# Patient Record
Sex: FEMALE | Race: BLACK OR AFRICAN AMERICAN | NOT HISPANIC OR LATINO | Employment: FULL TIME | URBAN - METROPOLITAN AREA
[De-identification: names, ages, dates, MRNs, and addresses within clinical notes are randomized per-mention and may not be internally consistent; named-entity substitution may affect disease eponyms.]

---

## 2019-01-17 LAB — PAP SMEAR: NORMAL

## 2019-10-08 ENCOUNTER — HOSPITAL ENCOUNTER (OUTPATIENT)
Dept: RADIOLOGY | Facility: HOSPITAL | Age: 39
Discharge: HOME | End: 2019-10-08
Attending: FAMILY MEDICINE
Payer: COMMERCIAL

## 2019-10-08 ENCOUNTER — OFFICE VISIT (OUTPATIENT)
Dept: FAMILY MEDICINE | Facility: CLINIC | Age: 39
End: 2019-10-08
Payer: COMMERCIAL

## 2019-10-08 VITALS
SYSTOLIC BLOOD PRESSURE: 116 MMHG | DIASTOLIC BLOOD PRESSURE: 84 MMHG | WEIGHT: 194.69 LBS | OXYGEN SATURATION: 98 % | HEIGHT: 67 IN | TEMPERATURE: 98 F | BODY MASS INDEX: 30.56 KG/M2 | HEART RATE: 82 BPM

## 2019-10-08 DIAGNOSIS — G89.29 CHRONIC HEEL PAIN, RIGHT: ICD-10-CM

## 2019-10-08 DIAGNOSIS — R23.2 HOT FLASHES: Primary | ICD-10-CM

## 2019-10-08 DIAGNOSIS — M79.671 CHRONIC HEEL PAIN, RIGHT: ICD-10-CM

## 2019-10-08 DIAGNOSIS — Z13.6 SCREENING FOR CARDIOVASCULAR CONDITION: ICD-10-CM

## 2019-10-08 DIAGNOSIS — R53.83 OTHER FATIGUE: ICD-10-CM

## 2019-10-08 DIAGNOSIS — E66.09 CLASS 1 OBESITY DUE TO EXCESS CALORIES WITHOUT SERIOUS COMORBIDITY WITH BODY MASS INDEX (BMI) OF 30.0 TO 30.9 IN ADULT: ICD-10-CM

## 2019-10-08 DIAGNOSIS — B37.31 YEAST INFECTION OF THE VAGINA: ICD-10-CM

## 2019-10-08 PROCEDURE — 73630 XR FOOT COMPLETE 3 VIEW RIGHT: ICD-10-PCS | Mod: 26,RT,, | Performed by: RADIOLOGY

## 2019-10-08 PROCEDURE — 99203 OFFICE O/P NEW LOW 30 MIN: CPT | Mod: S$GLB,,, | Performed by: FAMILY MEDICINE

## 2019-10-08 PROCEDURE — 73630 X-RAY EXAM OF FOOT: CPT | Mod: TC,FY,PO,RT

## 2019-10-08 PROCEDURE — 99203 PR OFFICE/OUTPT VISIT, NEW, LEVL III, 30-44 MIN: ICD-10-PCS | Mod: S$GLB,,, | Performed by: FAMILY MEDICINE

## 2019-10-08 PROCEDURE — 99999 PR PBB SHADOW E&M-NEW PATIENT-LVL IV: CPT | Mod: PBBFAC,,, | Performed by: FAMILY MEDICINE

## 2019-10-08 PROCEDURE — 73630 X-RAY EXAM OF FOOT: CPT | Mod: 26,RT,, | Performed by: RADIOLOGY

## 2019-10-08 PROCEDURE — 99204 OFFICE O/P NEW MOD 45 MIN: CPT | Mod: PBBFAC,25,PO | Performed by: FAMILY MEDICINE

## 2019-10-08 PROCEDURE — 99999 PR PBB SHADOW E&M-NEW PATIENT-LVL IV: ICD-10-PCS | Mod: PBBFAC,,, | Performed by: FAMILY MEDICINE

## 2019-10-08 NOTE — PATIENT INSTRUCTIONS
Eating Heart-Healthy Food: Using the DASH Plan    Eating for your heart doesnt have to be hard or boring. You just need to know how to make healthier choices. The DASH eating plan has been developed to help you do just that. DASH stands for Dietary Approaches to Stop Hypertension. It is a plan that has been proven to be healthier for your heart and to lower your risk for high blood pressure. It can also help lower your risk for cancer, heart disease, osteoporosis, and diabetes.  Choosing from each food group  Choose foods from each of the food groups below each day. Try to get the recommended number of servings for each food group. The serving numbers are based on a diet of 2,000 calories a day. Talk to your doctor if youre unsure about your calorie needs. Along with getting the correct servings, the DASH plan also recommends a sodium intake less than 2,300 mg per day.        Grains  Servings: 6 to 8 a day  A serving is:  · 1 slice bread  · 1 ounce dry cereal  · Half a cup cooked rice, pasta or cereal  Best choices: Whole grains and any grains high in fiber. Vegetables  Servings: 4 to 5 a day  A serving is:  · 1 cup raw leafy vegetable  · Half a cup cut-up raw or cooked vegetable  · Half a cup vegetable juice  Best choices: Fresh or frozen vegetables prepared without added salt or fat.   Fruits  Servings: 4 to 5 a day  A serving is:  · 1 medium fruit  · One-quarter cup dried fruit  · Half a cup fresh, frozen, or canned fruit  · Half a cup of 100% fruit juices  Best choices: A variety of fresh fruits of different colors. Whole fruits are a better choice than fruit juices. Low-fat or fat-free dairy  Servings: 2 to 3 a day  A serving is:  · 1 cup milk  · 1 cup yogurt  · One and a half ounces cheese  Best choices: Skim or 1% milk, low-fat or fat-free yogurt or buttermilk, and low-fat cheeses.         Lean meats, poultry, fish  Servings: 6 or fewer a day  A serving is:  · 1 ounce cooked meats, poultry, or fish  · 1  egg  Best choices: Lean poultry and fish. Trim away visible fat. Broil, grill, roast, or boil instead of frying. Remove skin from poultry before eating. Limit how much red meat you eat.  Nuts, seeds, beans  Servings: 4 to 5 a week  A serving is:  · One-third cup nuts (one and a half ounces)  · 2 tablespoons nut butter or seeds  · Half a cup cooked dry beans or legumes  Best choices: Dry roasted nuts with no salt added, lentils, kidney beans, garbanzo beans, and whole white beans.   Fats and oils  Servings: 2 to 3 a day  A serving is:  · 1 teaspoon vegetable oil  · 1 teaspoon soft margarine  · 1 tablespoon mayonnaise  · 2 tablespoons salad dressing  Best choices: Nut and vegetable oils (nontropical vegetable oils), such as olive and canola oil. Sweets  Servings: 5 a week or fewer  A serving is:  · 1 tablespoon sugar, maple syrup, or honey  · 1 tablespoon jam or jelly  · 1 half-ounce jelly beans (about 15)  · 1 cup lemonade  Best choices: Dried fruit can be a satisfying sweet. Choose low-fat sweets. And watch your serving sizes!      For more on the DASH eating plan, visit:  www.nhlbi.nih.gov/health/health-topics/topics/dash   Date Last Reviewed: 6/1/2016  © 1536-6153 [employer]. [address] [place] [address], [place] [address]. All rights reserved. This information is not intended as a substitute for professional medical care. Always follow your healthcare professional's instructions.

## 2019-10-08 NOTE — PROGRESS NOTES
Subjective:       Patient ID: Dina Stewart is a 39 y.o. female.    Chief Complaint: Establish Care (having hot flashes, soles of feet hurt-sharp pain)    HPI     The patient is coming here today to establish a new primary care physician.  The patient complains of hot flashes, fatigued, recurrent use infections.  The patient went to see her gynecologist who was treating her with Diflucan multiple times, the patient was getting better and the symptoms recur, she was told to follow with the primary care doctor for further assessment on this.  The patient also complains of pain on the right foot, the patient stated the symptoms are getting worse, started more than 2 months ago.  The patient is not been trying medications for this, the patient stated that hurts worse when she walks.  She denies symptoms of numbness, tingling sensation, redness, swelling.    Past medical history, past social history, surgical history, family history was reviewed and discussed with the patient.    Review of Systems   Constitutional: Positive for fatigue. Negative for activity change and appetite change.   HENT: Negative for congestion and ear discharge.    Eyes: Negative for discharge and itching.   Respiratory: Negative for choking and chest tightness.    Cardiovascular: Negative for chest pain and leg swelling.   Gastrointestinal: Negative for abdominal distention and abdominal pain.   Endocrine: Negative for cold intolerance and heat intolerance.   Genitourinary: Negative for dysuria and flank pain.   Musculoskeletal: Positive for arthralgias. Negative for back pain.   Skin: Negative for pallor and rash.   Allergic/Immunologic: Negative for environmental allergies and food allergies.   Neurological: Negative for dizziness and facial asymmetry.   Hematological: Negative for adenopathy. Does not bruise/bleed easily.   Psychiatric/Behavioral: Negative for agitation and confusion.       Objective:      Physical Exam   Constitutional: She  appears well-developed and well-nourished. No distress.   HENT:   Head: Normocephalic and atraumatic.   Right Ear: External ear normal.   Left Ear: External ear normal.   Mouth/Throat: Oropharynx is clear and moist. No oropharyngeal exudate.   Eyes: Pupils are equal, round, and reactive to light. Conjunctivae are normal. Right eye exhibits no discharge. Left eye exhibits no discharge. No scleral icterus.   Neck: Neck supple. No thyromegaly present.   Cardiovascular: Normal rate, regular rhythm and normal heart sounds.   No murmur heard.  Pulmonary/Chest: Effort normal and breath sounds normal. No respiratory distress. She has no wheezes.   Abdominal: She exhibits no distension. There is no tenderness.   Musculoskeletal: She exhibits tenderness (Right foot plantar aspect pain). She exhibits no deformity.   Neurological: No cranial nerve deficit. Coordination normal.   Skin: No rash noted. She is not diaphoretic. No erythema. No pallor.   Psychiatric: She has a normal mood and affect. Her behavior is normal. Judgment and thought content normal.   Nursing note and vitals reviewed.      Assessment:       1. Hot flashes    2. Other fatigue    3. Yeast infection of the vagina    4. Class 1 obesity due to excess calories without serious comorbidity with body mass index (BMI) of 30.0 to 30.9 in adult    5. Screening for cardiovascular condition    6. Chronic heel pain, right        Plan:       Hot flashes:  New problem, workup needed  -     PROGESTERONE; Future; Expected date: 10/08/2019  -     ESTROGENS, TOTAL; Future; Expected date: 10/08/2019  -     Luteinizing hormone; Future; Expected date: 10/08/2019  -     Follicle stimulating hormone; Future; Expected date: 10/08/2019    Other fatigue:  New problem workup needed  -     CBC auto differential; Future; Expected date: 10/08/2019  -     Comprehensive metabolic panel; Future; Expected date: 10/08/2019  -     TSH; Future; Expected date: 10/08/2019    Yeast infection of the  vagina:  Improved    Class 1 obesity due to excess calories without serious comorbidity with body mass index (BMI) of 30.0 to 30.9 in adult:  Worsening  -     TSH; Future; Expected date: 10/08/2019  -     INSULIN, RANDOM; Future; Expected date: 10/08/2019    Screening for cardiovascular condition  -     Lipid panel; Future; Expected date: 10/08/2019    Chronic heel pain, right: New problem workup needed  -     X-Ray Foot Complete Right; Future; Expected date: 10/08/2019  -     Ambulatory referral to Podiatry    Will refer the patient for x-rays of the foot, will refer the patient also the podiatrist.  Will call the patient after we have the results of the test.  The patient was advised to eat healthy, avoid fried foods, red meat and processed starches.  The patient's BMI has been recorded in the chart. The patient has been provided educational materials regarding the benefits of attaining and maintaining a normal weight. We will continue to address and follow this issue during follow up visits.Patient agreed with assessment and plan. Patient verbalized understanding.

## 2019-10-09 ENCOUNTER — LAB VISIT (OUTPATIENT)
Dept: LAB | Facility: HOSPITAL | Age: 39
End: 2019-10-09
Attending: FAMILY MEDICINE
Payer: COMMERCIAL

## 2019-10-09 DIAGNOSIS — Z13.6 SCREENING FOR CARDIOVASCULAR CONDITION: ICD-10-CM

## 2019-10-09 DIAGNOSIS — R53.83 OTHER FATIGUE: ICD-10-CM

## 2019-10-09 DIAGNOSIS — R23.2 HOT FLASHES: ICD-10-CM

## 2019-10-09 DIAGNOSIS — E66.09 CLASS 1 OBESITY DUE TO EXCESS CALORIES WITHOUT SERIOUS COMORBIDITY WITH BODY MASS INDEX (BMI) OF 30.0 TO 30.9 IN ADULT: ICD-10-CM

## 2019-10-09 LAB
ALBUMIN SERPL BCP-MCNC: 3.9 G/DL (ref 3.5–5.2)
ALP SERPL-CCNC: 57 U/L (ref 55–135)
ALT SERPL W/O P-5'-P-CCNC: 16 U/L (ref 10–44)
ANION GAP SERPL CALC-SCNC: 7 MMOL/L (ref 8–16)
AST SERPL-CCNC: 20 U/L (ref 10–40)
BASOPHILS # BLD AUTO: 0.02 K/UL (ref 0–0.2)
BASOPHILS NFR BLD: 0.5 % (ref 0–1.9)
BILIRUB SERPL-MCNC: 0.2 MG/DL (ref 0.1–1)
BUN SERPL-MCNC: 18 MG/DL (ref 6–20)
CALCIUM SERPL-MCNC: 9.5 MG/DL (ref 8.7–10.5)
CHLORIDE SERPL-SCNC: 107 MMOL/L (ref 95–110)
CHOLEST SERPL-MCNC: 202 MG/DL (ref 120–199)
CHOLEST/HDLC SERPL: 4 {RATIO} (ref 2–5)
CO2 SERPL-SCNC: 26 MMOL/L (ref 23–29)
CREAT SERPL-MCNC: 0.8 MG/DL (ref 0.5–1.4)
DIFFERENTIAL METHOD: ABNORMAL
EOSINOPHIL # BLD AUTO: 0.1 K/UL (ref 0–0.5)
EOSINOPHIL NFR BLD: 3.8 % (ref 0–8)
ERYTHROCYTE [DISTWIDTH] IN BLOOD BY AUTOMATED COUNT: 14.7 % (ref 11.5–14.5)
EST. GFR  (AFRICAN AMERICAN): >60 ML/MIN/1.73 M^2
EST. GFR  (NON AFRICAN AMERICAN): >60 ML/MIN/1.73 M^2
FSH SERPL-ACNC: 5.8 MIU/ML
GLUCOSE SERPL-MCNC: 96 MG/DL (ref 70–110)
HCT VFR BLD AUTO: 37.2 % (ref 37–48.5)
HDLC SERPL-MCNC: 51 MG/DL (ref 40–75)
HDLC SERPL: 25.2 % (ref 20–50)
HGB BLD-MCNC: 10.8 G/DL (ref 12–16)
IMM GRANULOCYTES # BLD AUTO: 0.01 K/UL (ref 0–0.04)
IMM GRANULOCYTES NFR BLD AUTO: 0.3 % (ref 0–0.5)
INSULIN COLLECTION INTERVAL: 0
INSULIN SERPL-ACNC: 13.1 UU/ML
LDLC SERPL CALC-MCNC: 133.6 MG/DL (ref 63–159)
LH SERPL-ACNC: 8.5 MIU/ML
LYMPHOCYTES # BLD AUTO: 1.9 K/UL (ref 1–4.8)
LYMPHOCYTES NFR BLD: 52.6 % (ref 18–48)
MCH RBC QN AUTO: 23.2 PG (ref 27–31)
MCHC RBC AUTO-ENTMCNC: 29 G/DL (ref 32–36)
MCV RBC AUTO: 80 FL (ref 82–98)
MONOCYTES # BLD AUTO: 0.5 K/UL (ref 0.3–1)
MONOCYTES NFR BLD: 12.5 % (ref 4–15)
NEUTROPHILS # BLD AUTO: 1.1 K/UL (ref 1.8–7.7)
NEUTROPHILS NFR BLD: 30.3 % (ref 38–73)
NONHDLC SERPL-MCNC: 151 MG/DL
NRBC BLD-RTO: 0 /100 WBC
PLATELET # BLD AUTO: 333 K/UL (ref 150–350)
PMV BLD AUTO: 10.5 FL (ref 9.2–12.9)
POTASSIUM SERPL-SCNC: 4.5 MMOL/L (ref 3.5–5.1)
PROGEST SERPL-MCNC: 0.1 NG/ML
PROT SERPL-MCNC: 7.2 G/DL (ref 6–8.4)
RBC # BLD AUTO: 4.65 M/UL (ref 4–5.4)
SODIUM SERPL-SCNC: 140 MMOL/L (ref 136–145)
TRIGL SERPL-MCNC: 87 MG/DL (ref 30–150)
TSH SERPL DL<=0.005 MIU/L-ACNC: 1.06 UIU/ML (ref 0.4–4)
WBC # BLD AUTO: 3.67 K/UL (ref 3.9–12.7)

## 2019-10-09 PROCEDURE — 83001 ASSAY OF GONADOTROPIN (FSH): CPT

## 2019-10-09 PROCEDURE — 82672 ASSAY OF ESTROGEN: CPT

## 2019-10-09 PROCEDURE — 80061 LIPID PANEL: CPT

## 2019-10-09 PROCEDURE — 85025 COMPLETE CBC W/AUTO DIFF WBC: CPT

## 2019-10-09 PROCEDURE — 83525 ASSAY OF INSULIN: CPT

## 2019-10-09 PROCEDURE — 80053 COMPREHEN METABOLIC PANEL: CPT

## 2019-10-09 PROCEDURE — 84144 ASSAY OF PROGESTERONE: CPT

## 2019-10-09 PROCEDURE — 84443 ASSAY THYROID STIM HORMONE: CPT

## 2019-10-09 PROCEDURE — 83002 ASSAY OF GONADOTROPIN (LH): CPT

## 2019-10-10 ENCOUNTER — TELEPHONE (OUTPATIENT)
Dept: FAMILY MEDICINE | Facility: CLINIC | Age: 39
End: 2019-10-10

## 2019-10-10 NOTE — TELEPHONE ENCOUNTER
----- Message from Maritza Boss sent at 10/10/2019  1:28 PM CDT -----  Contact: Patient  Type:  Patient Returning Call    Who Called: Patient  Who Left Message for Patient: Sofya  Does the patient know what this is regarding?:  Test results  Best Call Back Number:    Additional Information:  Call to pod. No answer.

## 2019-10-10 NOTE — TELEPHONE ENCOUNTER
----- Message from Trudy Florez sent at 10/10/2019  9:10 AM CDT -----  Contact: Patient  Type: Needs Medical Advice    Who Called:  Patient  Best Call Back Number:   Additional Information: Patient was advised that the Dr would get back in contact with her today about what to do for her foot pain.  Had an x ray done yesterday and was referred to a podiatrist but wants to know what to do in the meantime.

## 2019-10-10 NOTE — TELEPHONE ENCOUNTER
Attempted to contact patient concerning recommendations for foot pain in lieu of podiatry appt. Left vm.

## 2019-10-10 NOTE — TELEPHONE ENCOUNTER
Please recommend the patient:    Rest -- Limiting athletic activities and getting extra rest may help to relieve your symptoms. If possible, avoid excessive and repetitive heel impact from jumping, dancing, and distance running. A complete lack of physical activity, however, is not recommended, as this can lead to stiffening and a return of pain.  Icing -- Applying ice to the area, for example for 20 minutes up to four times daily, may relieve pain. Ice and massage may also be used before exercise.  Stretching exercises for the plantar fascia.  Protective footwear.  Can take ibuprofen 600 mg every 8 hr for 7 days with meals, if she needs a new prescription, please let me know.  Until she sees the specialist.  Any other questions please let me know.  Thank you

## 2019-10-10 NOTE — TELEPHONE ENCOUNTER
----- Message from Wendy Sneed sent at 10/10/2019  2:01 PM CDT -----  Contact: Self  Patient is requesting a call back to get the lab and xray results from 10/9 and 10/8, she is nervious about these results.  Call back at [phone] (home).  Thanks

## 2019-10-10 NOTE — TELEPHONE ENCOUNTER
----- Message from Amanda Ventura sent at 10/10/2019  1:18 PM CDT -----  Type:  Patient Returning Call    Who Called:  Patient  Who Left Message for Patient:  Na Kenny  Does the patient know what this is regarding?:  Test results  Best Call Back Number:  [phone]  Additional Information:

## 2019-10-10 NOTE — TELEPHONE ENCOUNTER
----- Message from Maritza Boss sent at 10/10/2019 11:56 AM CDT -----  Contact: Patient  Type:  Patient Returning Call    Who Called:  Patient  Who Left Message for Patient:  Na  Does the patient know what this is regarding?:  Test results  Best Call Back Number:   Additional Information:  Call to pod. No answer

## 2019-10-14 DIAGNOSIS — D50.8 OTHER IRON DEFICIENCY ANEMIA: Primary | ICD-10-CM

## 2019-10-14 LAB — ESTROGEN SERPL-MCNC: 317 PG/ML

## 2019-10-14 RX ORDER — FERROUS SULFATE 325(65) MG
325 TABLET ORAL 3 TIMES DAILY
Qty: 90 TABLET | Refills: 3 | COMMUNITY
Start: 2019-10-14 | End: 2023-01-05

## 2019-10-15 ENCOUNTER — TELEPHONE (OUTPATIENT)
Dept: FAMILY MEDICINE | Facility: CLINIC | Age: 39
End: 2019-10-15

## 2019-10-15 NOTE — TELEPHONE ENCOUNTER
----- Message from Raissa Zelaya sent at 10/15/2019  8:15 AM CDT -----  Contact: patient   Pt need recent lab results, please call back at [phone] (home)

## 2019-10-17 DIAGNOSIS — Z12.39 BREAST CANCER SCREENING: ICD-10-CM

## 2019-10-22 ENCOUNTER — OFFICE VISIT (OUTPATIENT)
Dept: PODIATRY | Facility: CLINIC | Age: 39
End: 2019-10-22
Payer: COMMERCIAL

## 2019-10-22 VITALS
WEIGHT: 194 LBS | HEIGHT: 67 IN | DIASTOLIC BLOOD PRESSURE: 90 MMHG | HEART RATE: 96 BPM | BODY MASS INDEX: 30.45 KG/M2 | SYSTOLIC BLOOD PRESSURE: 128 MMHG

## 2019-10-22 DIAGNOSIS — M21.6X1 EQUINUS DEFORMITY OF RIGHT FOOT: ICD-10-CM

## 2019-10-22 DIAGNOSIS — M79.671 PAIN IN RIGHT FOOT: ICD-10-CM

## 2019-10-22 DIAGNOSIS — M72.2 PLANTAR FASCIITIS: Primary | ICD-10-CM

## 2019-10-22 PROCEDURE — 99999 PR PBB SHADOW E&M-EST. PATIENT-LVL III: CPT | Mod: PBBFAC,,, | Performed by: PODIATRIST

## 2019-10-22 PROCEDURE — 99213 OFFICE O/P EST LOW 20 MIN: CPT | Mod: PBBFAC,PN | Performed by: PODIATRIST

## 2019-10-22 PROCEDURE — 99203 PR OFFICE/OUTPT VISIT, NEW, LEVL III, 30-44 MIN: ICD-10-PCS | Mod: S$GLB,,, | Performed by: PODIATRIST

## 2019-10-22 PROCEDURE — 99203 OFFICE O/P NEW LOW 30 MIN: CPT | Mod: S$GLB,,, | Performed by: PODIATRIST

## 2019-10-22 PROCEDURE — 99999 PR PBB SHADOW E&M-EST. PATIENT-LVL III: ICD-10-PCS | Mod: PBBFAC,,, | Performed by: PODIATRIST

## 2019-10-22 RX ORDER — METHYLPREDNISOLONE 4 MG/1
TABLET ORAL
Qty: 1 PACKAGE | Refills: 0 | Status: SHIPPED | OUTPATIENT
Start: 2019-10-22 | End: 2023-01-05

## 2019-10-22 RX ORDER — CLINDAMYCIN PHOSPHATE 20 MG/G
CREAM VAGINAL
Refills: 0 | COMMUNITY
Start: 2019-10-16 | End: 2023-01-05

## 2019-10-22 RX ORDER — IBUPROFEN 600 MG/1
600 TABLET ORAL 3 TIMES DAILY
Qty: 90 TABLET | Refills: 0 | Status: SHIPPED | OUTPATIENT
Start: 2019-10-22 | End: 2019-11-21

## 2019-10-22 NOTE — PATIENT INSTRUCTIONS
- Wear supportive shoes such as sneakers with arch supports.    - Look for Superfeet or Powerstep arch supports and sneakers such as Hoka One One, Hercules, and New Balance.    - Rest/reduce ambulation to necessary activities of daily living.    - Ice heel for no more than 20 minutes/per hour.    - Elevate foot as much as possible throughout the day.    - Perform Achilles/plantar fascia stretches as much as possible throughout the day.    - Take Medrol Dosepak and ibuprofen as prescribed.  Begin taking ibuprofen on last day of Medrol Dosepak.    - Notify clinic if pain worsens or fails to improve.

## 2019-10-22 NOTE — LETTER
October 31, 2019      Vanessa Mccullough MD  [address] [facility] [address] [place] [address]           [facility]  [address] [place] [address]  Phone: [phone]          Patient: Dina Stewart   MR Number: [id number]   YOB: 1980   Date of Visit: 10/22/2019       Dear Dr. Vanessa Mccullough:    Thank you for referring Dina Stewart to me for evaluation. Attached you will find relevant portions of my assessment and plan of care.    If you have questions, please do not hesitate to call me. I look forward to following Dina Stewart along with you.    Sincerely,    Vidya Rubalcava, DPSILVA    Enclosure  CC:  No Recipients    If you would like to receive this communication electronically, please contact [email or web address] or [phone] to request more information on [facility] [employer] access.    For providers and/or their staff who would like to refer a patient to [facility], please contact us through our one-stop-shop provider referral line, [facility], at [phone].    If you feel you have received this communication in error or would no longer like to receive these types of communications, please e-mail [email or web address]

## 2019-10-22 NOTE — PROGRESS NOTES
Subjective:      Patient ID: Dina Stewart is a 39 y.o. female.    Chief Complaint: Heel Pain (R foot heel pain x1mth, Dr Mccullough 10/2019 )      HPI:  Dina is a 39 y.o. female who presents to the clinic complaining of heel pain in the right foot, especially with the first step in the morning. The pain is described as Aching, Throbbing, Grabbing, Tight and Sharp. The onset of the pain was gradual and has worsened over the past several weeks. Dina rates the pain as 8/10. She denies a history of trauma. Prior treatments include resting, elevating foot in recliner, and massaging heel.  Patient denies any other pedal complaints at this time.     PCP:  Vanessa Mccullough MD  Date last seen:  10/8/19    Review of Systems   Constitutional: Negative for appetite change, fever, chills, fatigue and unexpected weight change.   Respiratory: Negative for cough, wheezing, and shortness of breath.   Cardiovascular: Negative for chest pain, claudication, cyanosis, and leg swelling.  Endocrine:  Negative for intolerance to cold, intolerance to heat, polydipsia, polyphagia, and polyuria.    Gastrointestinal: Negative for nausea, vomiting, diarrhea, and constipation.   Musculoskeletal: Negative for back pain, arthritis, joint pain, joint swelling.  Positive for myalgias, stiffness.   Skin: Negative for nail bed changes, discoloration, rash, itching, poor wound healing, suspicious lesion, and unusual hair distribution.   Neurological: Negative for loss of balance, sensory change, paresthesias, and numbness.  Positive for pain.  Hematological: Negative for adenopathy, bleeding, and bruising easily.   Psychiatric/Behavioral: The patient is not nervous/anxious.  Negative for altered mental status.    No results found for: HGBA1C    Past Medical History:   Diagnosis Date    AMA (advanced maternal age) multigravida 35+     Autoimmune disease     Irregular contractions 6/23/2016    Multigravida of advanced maternal age in third trimester  "4/7/2016    Term pregnancy 6/25/2016     History reviewed. No pertinent surgical history.  Family History   Problem Relation Age of Onset    Diabetes Father     Arthritis Mother      Social History     Socioeconomic History    Marital status: Single     Spouse name: Not on file    Number of children: Not on file    Years of education: Not on file    Highest education level: Not on file   Occupational History    Not on file   Social Needs    Financial resource strain: Not on file    Food insecurity:     Worry: Not on file     Inability: Not on file    Transportation needs:     Medical: Not on file     Non-medical: Not on file   Tobacco Use    Smoking status: Light Tobacco Smoker     Types: Cigarettes    Smokeless tobacco: Never Used    Tobacco comment: smoke 2 to 3 cigarettes daily   Substance and Sexual Activity    Alcohol use: No    Drug use: No    Sexual activity: Yes     Partners: Male   Lifestyle    Physical activity:     Days per week: Not on file     Minutes per session: Not on file    Stress: Very much   Relationships    Social connections:     Talks on phone: Not on file     Gets together: Not on file     Attends Hindu service: Not on file     Active member of club or organization: Not on file     Attends meetings of clubs or organizations: Not on file     Relationship status: Not on file   Other Topics Concern    Not on file   Social History Narrative    Not on file           Objective:        BP (!) 128/90   Pulse 96   Ht 5' 7" (1.702 m)   Wt 88 kg (194 lb)   BMI 30.38 kg/m²     Physical Exam   Constitutional: Patient is oriented to person, place, and time. Patient appears well-developed and well-nourished. No acute distress.     Psychiatric: Patient has a normal mood and affect. Patient's speech is normal and behavior is normal. Judgment is normal. Cognition and memory are normal.     Right pedal exam was performed today.  Vascular: Pedal pulses palpable 2/4 DP & PT.  CFT is " < 3 seconds to the hallux.  Skin temperature is warm to cool proximal tibia to distal toes without localized increase in calor noted.  Localized erythema and edema noted to the plantar medial heel at the insertion of the medial band of the plantar fascia on the calcaneus.  No ecchymosis noted to the foot or ankle.  Hair growth present distally to the LE.  Telangectasias and reticular veins present to the LE.     Musculoskeletal: Ankle joint ROM is decreased. Subtalar joint ROM is decreased.  Midtarsal joint ROM is decreased.  1st ray ROM is within normal limits.  1st  MTPJ ROM is decreased.  Ankle joint dorsiflexion is restricted with the knee extended and flexed per Silfverskiold exam.    Muscle strength is 5/5 for all LE muscle groups tested.    Neurological: Epicritic sensation is grossly Intact to the foot.   Achilles DTR and Chaddock STR are Intact to right lower extremity.   Negative Babinski sign right lower extremity.  Negative clonus sign right lower extremity.  Tenderness to palpation noted to the plantar medial heel at the insertion of the medial band of the plantar fascia on the calcaneus.    Dermatological: Toenails 1-5 right are WNL in length and thickness.  Webspaces 1-4 right are clean, dry, and intact.  Skin turgor is supple.  No dry, flaky skin noted to the LE.  No open wounds or suspicious pigmented lesions appreciable to the foot or ankle.    Nursing note and vitals reviewed.        Assessment:       Encounter Diagnoses   Name Primary?    Plantar fasciitis Yes    Equinus deformity of right foot     Pain in right foot          Plan:       Dina was seen today for heel pain.    Diagnoses and all orders for this visit:    Plantar fasciitis  -     methylPREDNISolone (MEDROL DOSEPACK) 4 mg tablet; use as directed  -     ibuprofen (ADVIL,MOTRIN) 600 MG tablet; Take 1 tablet (600 mg total) by mouth 3 (three) times daily.    Equinus deformity of right foot  -     methylPREDNISolone (MEDROL DOSEPACK) 4  mg tablet; use as directed  -     ibuprofen (ADVIL,MOTRIN) 600 MG tablet; Take 1 tablet (600 mg total) by mouth 3 (three) times daily.    Pain in right foot  -     methylPREDNISolone (MEDROL DOSEPACK) 4 mg tablet; use as directed  -     ibuprofen (ADVIL,MOTRIN) 600 MG tablet; Take 1 tablet (600 mg total) by mouth 3 (three) times daily.      I counseled the patient on her conditions, their implications and medical management.    - Reviewed xrays from 10/8/19 with patient.    - Educated patient on proper foot care and supportive, accommodative shoe gear.    - Educated patient on PRICE therapy and Achilles/plantar fascial stretches with demonstration of stretches and stretching technique handout given to patient.    Patient was given the following recommendations and instructions:  Patient Instructions   - Wear supportive shoes such as sneakers with arch supports.    - Look for Superfeet or Powerstep arch supports and sneakers such as Hoka One One, Hercules, and New Balance.    - Rest/reduce ambulation to necessary activities of daily living.    - Ice heel for no more than 20 minutes/per hour.    - Elevate foot as much as possible throughout the day.    - Perform Achilles/plantar fascia stretches as much as possible throughout the day.    - Take Medrol Dosepak and ibuprofen as prescribed.  Begin taking ibuprofen on last day of Medrol Dosepak.    - Notify clinic if pain worsens or fails to improve.        Vidya Rubalcava DPM        Dictation was performed using M*Modal Fluency.  Transcription errors may be present.

## 2020-10-05 ENCOUNTER — PATIENT MESSAGE (OUTPATIENT)
Dept: ADMINISTRATIVE | Facility: HOSPITAL | Age: 40
End: 2020-10-05

## 2020-12-17 ENCOUNTER — PATIENT OUTREACH (OUTPATIENT)
Dept: ADMINISTRATIVE | Facility: HOSPITAL | Age: 40
End: 2020-12-17

## 2020-12-17 DIAGNOSIS — Z12.31 OTHER SCREENING MAMMOGRAM: ICD-10-CM

## 2020-12-17 NOTE — PROGRESS NOTES
Mammogram  bulk order report  DIS reviewed for test needed    [facility] reviewed.  Pap Smear and/or  LABS   Updated with report

## 2021-07-07 ENCOUNTER — PATIENT MESSAGE (OUTPATIENT)
Dept: ADMINISTRATIVE | Facility: HOSPITAL | Age: 41
End: 2021-07-07

## 2021-08-23 ENCOUNTER — TELEPHONE (OUTPATIENT)
Dept: FAMILY MEDICINE | Facility: CLINIC | Age: 41
End: 2021-08-23

## 2022-02-03 ENCOUNTER — TELEPHONE (OUTPATIENT)
Dept: FAMILY MEDICINE | Facility: CLINIC | Age: 42
End: 2022-02-03
Payer: MEDICAID

## 2022-02-03 NOTE — TELEPHONE ENCOUNTER
----- Message from Serene Melara sent at 2/3/2022  7:32 AM CST -----  Contact: pt  Type: Needs Medical Advice    Who Called:  Pt  Best Call Back Number: [phone]    Additional Information: Requesting a call back regarding needing a referral for psychiatry  Please Advise ---Thank you

## 2022-05-31 ENCOUNTER — PATIENT MESSAGE (OUTPATIENT)
Dept: ADMINISTRATIVE | Facility: HOSPITAL | Age: 42
End: 2022-05-31
Payer: MEDICAID

## 2023-01-18 NOTE — TELEPHONE ENCOUNTER
Spoke to pt and advised pt of xray results and recommendations. Pt verbalized understanding.    [[id number]]